# Patient Record
Sex: FEMALE | Race: WHITE | NOT HISPANIC OR LATINO | Employment: UNEMPLOYED | URBAN - METROPOLITAN AREA
[De-identification: names, ages, dates, MRNs, and addresses within clinical notes are randomized per-mention and may not be internally consistent; named-entity substitution may affect disease eponyms.]

---

## 2023-10-26 ENCOUNTER — OFFICE VISIT (OUTPATIENT)
Dept: OBGYN CLINIC | Facility: CLINIC | Age: 58
End: 2023-10-26

## 2023-10-26 VITALS
WEIGHT: 236.6 LBS | HEART RATE: 69 BPM | OXYGEN SATURATION: 98 % | DIASTOLIC BLOOD PRESSURE: 100 MMHG | BODY MASS INDEX: 33.87 KG/M2 | TEMPERATURE: 97.9 F | HEIGHT: 70 IN | SYSTOLIC BLOOD PRESSURE: 152 MMHG

## 2023-10-26 DIAGNOSIS — Z12.4 SCREENING FOR CERVICAL CANCER: ICD-10-CM

## 2023-10-26 DIAGNOSIS — N88.8 CERVICAL MASS: Primary | ICD-10-CM

## 2023-10-26 PROCEDURE — G0124 SCREEN C/V THIN LAYER BY MD: HCPCS | Performed by: PATHOLOGY

## 2023-10-26 PROCEDURE — G0145 SCR C/V CYTO,THINLAYER,RESCR: HCPCS | Performed by: PATHOLOGY

## 2023-10-26 PROCEDURE — G0476 HPV COMBO ASSAY CA SCREEN: HCPCS | Performed by: PHYSICIAN ASSISTANT

## 2023-10-26 PROCEDURE — 88305 TISSUE EXAM BY PATHOLOGIST: CPT | Performed by: PATHOLOGY

## 2023-10-26 NOTE — PROGRESS NOTES
Patient c/o cyst that came out of vagina this morning. Patient says about 5 weeks ago she felt cyst in vagina. She says it felt like it was attached to something. It started to come "down" and last night it came out of vagina. She brought the specimen into the office today. There was no pain. She did have bleeding about 3 weeks ago that lasted 5 days. She then had light spotting that started 1 week ago. She has been having intercourse with no pain or bleeding. No discharge/ itching/ odor. Patient has hx of of cysts ovary/ eyelid/ scalp. Menopausal since age 46. Patient has specimen wrapped in toilet tissue and in plastic bag. Specimen was removed and placed in specimen jar and will be sent to pathology. Encapsulated sac approx 2cm in length. Vitals:    10/26/23 0954   BP: 152/100   Pulse: 69   Temp: 97.9 °F (36.6 °C)   SpO2: 98%       External genitalia: no lesions, no discoloration  Urethra: no discharge or erythema  Bladder: nontender, good support  Vagina: no discharge, no lesions  Cervix: no lesions, no cervical motion tenderness,  scant blood noted at endocervix. Suspect this was where the mass came off.      Uterus: NT, normal size and shape  Adnexa: nontender, no masses    Assessment:  Cervical mass    pLan:  Specimen sent for pathology  Pap done  Pelvic US ordered  Await results  Schedule routine gyn exam  To PCP for eval of elevated blood pressure

## 2023-10-27 LAB
HPV HR 12 DNA CVX QL NAA+PROBE: NEGATIVE
HPV16 DNA CVX QL NAA+PROBE: NEGATIVE
HPV18 DNA CVX QL NAA+PROBE: NEGATIVE

## 2023-10-31 PROCEDURE — 88305 TISSUE EXAM BY PATHOLOGIST: CPT | Performed by: PATHOLOGY

## 2023-11-01 ENCOUNTER — TELEPHONE (OUTPATIENT)
Age: 58
End: 2023-11-01

## 2023-11-01 NOTE — TELEPHONE ENCOUNTER
Left message on machine for pt to call office re: discuss pathology results        Final Diagnosis   A. Specimen from endocervix:     - Adenomyomatous endocervical polyp.     - Surface erosion, acute inflammation and reactive epithelial change noted. - Negative for malignancy.

## 2023-11-01 NOTE — TELEPHONE ENCOUNTER
Spoke with patient and reviewed pathology results. Showing benign polyp. Recommend she proceed with pelvic US as planned to make sure no other polyps in uterus or cervical canal.  Patient also aware that I am still awaiting her pap smear results.

## 2023-11-02 ENCOUNTER — TELEPHONE (OUTPATIENT)
Dept: OBGYN CLINIC | Facility: CLINIC | Age: 58
End: 2023-11-02

## 2023-11-02 LAB
LAB AP GYN PRIMARY INTERPRETATION: NORMAL
Lab: NORMAL
PATH INTERP SPEC-IMP: NORMAL